# Patient Record
Sex: MALE | Race: AMERICAN INDIAN OR ALASKA NATIVE
[De-identification: names, ages, dates, MRNs, and addresses within clinical notes are randomized per-mention and may not be internally consistent; named-entity substitution may affect disease eponyms.]

---

## 2021-10-05 ENCOUNTER — HOSPITAL ENCOUNTER (OUTPATIENT)
Dept: HOSPITAL 5 - GIO | Age: 71
Discharge: HOME | End: 2021-10-05
Attending: INTERNAL MEDICINE
Payer: MEDICARE

## 2021-10-05 VITALS — DIASTOLIC BLOOD PRESSURE: 55 MMHG | SYSTOLIC BLOOD PRESSURE: 133 MMHG

## 2021-10-05 DIAGNOSIS — R13.10: Primary | ICD-10-CM

## 2021-10-05 DIAGNOSIS — E11.22: ICD-10-CM

## 2021-10-05 DIAGNOSIS — G47.30: ICD-10-CM

## 2021-10-05 DIAGNOSIS — K31.89: ICD-10-CM

## 2021-10-05 DIAGNOSIS — Z98.890: ICD-10-CM

## 2021-10-05 DIAGNOSIS — N18.30: ICD-10-CM

## 2021-10-05 DIAGNOSIS — Z87.891: ICD-10-CM

## 2021-10-05 DIAGNOSIS — K21.9: ICD-10-CM

## 2021-10-05 DIAGNOSIS — I25.10: ICD-10-CM

## 2021-10-05 DIAGNOSIS — K27.9: ICD-10-CM

## 2021-10-05 DIAGNOSIS — Z79.84: ICD-10-CM

## 2021-10-05 DIAGNOSIS — I12.9: ICD-10-CM

## 2021-10-05 DIAGNOSIS — Z79.899: ICD-10-CM

## 2021-10-05 DIAGNOSIS — E66.9: ICD-10-CM

## 2021-10-05 PROCEDURE — C1726 CATH, BAL DIL, NON-VASCULAR: HCPCS

## 2021-10-05 PROCEDURE — 43249 ESOPH EGD DILATION <30 MM: CPT

## 2021-10-05 PROCEDURE — 82962 GLUCOSE BLOOD TEST: CPT

## 2021-10-05 NOTE — ANESTHESIA DAY OF SURGERY
Anesthesia Day of Surgery





- Day of Surgery


Patient Examined: Yes


Patient H&P Reviewed: Yes


Patient is NPO: Yes


Beta Blockers: Yes (Patient took his bisoprolol today)

## 2021-10-05 NOTE — SHORT STAY SUMMARY
Short Stay Documentation


Date of service: 10/05/21


Narrative H&P: 





The patient presents for EGD with possible dilation for dysphagia.





- History


Past Medical History: CAD, diabetes, other (Renal insufficiency)


Past Surgical History: No surgical history


Social history: no significant social history





- Allergies and Medications


Current Medications: 


                                    Allergies





No Known Allergies Allergy (Unverified 09/30/21 13:19)


   





                                Home Medications











 Medication  Instructions  Recorded  Confirmed  Last Taken  Type


 


Amlodipine Besylate  09/30/21 09/30/21 Unknown History


 


Ascorbic Acid [Vitamin C]  09/30/21  Unknown History


 


Atorvastatin Calcium  09/30/21  Unknown History


 


Bisoprolol/Hydrochlorothiazide 1 each PO 09/30/21  Unknown History





[Bisoprolol-Hctz 5-6.25 mg Tab]     


 


Bumetanide [Bumex 1 mg tab] 1 mg PO 09/30/21  Unknown History


 


Cholecalciferol Vit D3 [Vitamin D3  09/30/21  Unknown History





1,000 UNIT TAB]     


 


Hydralazine HCl 100 mg PO 09/30/21  Unknown History


 


Latanoprost 0.005% [Xalatan 0.005%] 1 drop OP QPM 09/30/21 09/30/21 Unknown 

History


 


Losartan Potassium 100 mg PO 09/30/21  Unknown History


 


Metformin HCl [metFORMIN  mg PO 09/30/21  Unknown History





Osmotic]     


 


Nitroglycerin [Nitrostat] 0.4 mg SL 09/30/21  Unknown History


 


Omega-3S/Dha/Epa/Fish Oil [Fish 1 each PO 09/30/21  Unknown History





Oil Omega-3 Softgel]     


 


Omeprazole 20 mg PO 09/30/21  Unknown History


 


Pravastatin Sodium  09/30/21  Unknown History


 


Terazosin HCl 2 mg PO 09/30/21  Unknown History


 


Vitamin B12  09/30/21  Unknown History


 


Vitamin B6  09/30/21  Unknown History


 


Zinc [Zinc 50mg TAB] 50 mg PO 09/30/21  Unknown History


 


gemfibroziL [Lopid] 600 mg PO BID 09/30/21 09/30/21 Unknown History








Active Medications





Sodium Chloride (Nacl 0.9% 1000 Ml)  1,000 mls @ 50 mls/hr IV AS DIRECT MEJIA











- Physical exam


General appearance: no acute distress, well-nourished, obese


Integumentary: no rash, no growths, no abnormal pigmentation


HEENT: Atraumatic, PERRLA, EOMI, Mucous membr. moist/pink


Lungs: Clear to auscultation, Normal air movement


Breasts: deferred


Heart: Regular rate, Normal S1, Normal S2, No murmurs


Gastrointestinal: normoactive bowel sounds, no tenderness, no distended, no 

masses, no guarding, no organomegaly, obese


Male Genitourinary: deferred


Rectal Exam: deferred


Extremities: no ischemia, pulses intact, pulses symmetrical, No edema, normal 

temperature, normal color, Full ROM


Neurological: Normal gait, Normal speech, Strength at 5/5 X4 ext, Normal tone, 

Sensation intact, Cranial nerves 3-12 NL





- Brief post op/procedure progress note


Date of procedure: 10/05/21


Procedure: 





see dictation


Estimated blood loss: none


Pathology: none


Condition: stable





- Disposition


Condition at discharge: Good


Disposition: 01 HOME / SELF CARE / HOMELESS





- Discharge Diagnoses


(1) Dysphagia


Status: Acute   





Short Stay Discharge Plan


Activity: other (no driving for 24 hours)


Weight Bearing Status: Full Weight Bearing


Diet: regular


Follow up with: 


MARYBETH RUSSELL MD [Primary Care Provider] - 7 Days

## 2021-10-05 NOTE — ANESTHESIA CONSULTATION
Anesthesia Consult and Med Hx


Date of service: 10/05/21





- Airway


Anesthetic Teeth Evaluation: Good


ROM Head & Neck: Adequate


Mental/Hyoid Distance: Adequate


Mallampati Class: Class III


Intubation Access Assessment: Possibly Difficult





- Pulmonary Exam


CTA: Yes





- Cardiac Exam


Cardiac Exam: RRR





- Pre-Operative Health Status


ASA Pre-Surgery Classification: ASA3


Proposed Anesthetic Plan: MAC





- Pulmonary


Hx Smoking: Yes (quit ~30 yrs ago)


Hx Asthma: No


COPD: No


Hx Sleep Apnea: Yes (uses CPAP)





- Cardiovascular System


Hx Hypertension: Yes


Hx Coronary Artery Disease: Yes


Hx Heart Attack/AMI: No


Hx Angina: Yes (denies chest pain within the last month)





- Central Nervous System


Hx Back Pain: Yes (occasional )





- Gastrointestinal


Hx Gastroesophageal Reflux Disease: Yes (well controlled)





- Endocrine


Hx Renal Disease: Yes (Stage III CKD)


Hx Non-Insulin Dependent Diabetes: Yes (metformin only)


Hx Thyroid Disease: No





- Hematic


Hx Anemia: No





- Other Systems


Hx Alcohol Use: No


Hx Substance Use: No


Hx Cancer: No


Hx Obesity: Yes (BMI 41)





- Additional Comments


Anesthesia Medical History Comments: No hx of anesthetic complications.

## 2021-10-05 NOTE — OPERATIVE REPORT
Operative Report


Operative Report: 





Date of procedure: 10/5/2021


 


Procedure: Esophagogastroduodenoscopy with balloon dilation to 18 mm


 


Preprocedure diagnosis: Dysphagia to solid foods


 


Post procedure diagnosis: Mild peptic stricture.


 


Endoscopist: Dr. Tirado


 


Anesthesia: Monitored anesthesia care per anesthesia department


 


Medications: Propofol per anesthesia


 


Estimated blood loss: 0


 


After careful discussion of the nature and purpose of the procedure as well as 

details the technique risks benefits and alternatives consent was obtained.  The

patient was placed in the left lateral decubitus position and medicated per 

anesthesia.  The tip of the Leap  video scope was passed per orum under

direct vision into the esophagus and advanced into the stomach and descending 

duodenum.  The descending duodenum the duodenal bulb and pylorus were 

symmetrical and normal.  The scope was withdrawn into the stomach and the 

stomach then gently insufflated with air.  The antrum was normal.  The stomach 

was further insufflated and the scope was then retroflexed and partially 

withdrawn.  The cardia, fundus, and body of the stomach were within normal 

limits and easily distensible.The scope was then withdrawn in the forward 

position.  The esophagogastric junction was at 40 cm.  A mild peptic stricture 

was noted at the EG junction which provided no resistance to scope passage.  The

esophageal body was otherwise normal throughout.  Dilation was performed with a 

through-the-scope 15 to 18 mm balloon over 2 to 3 minutes.  The balloon was 

deflated and withdrawn.  The site was then reinspected and minimal trauma was 

evident.  The procedure was was well tolerated and the patient was observed in 

recovery.


 


Impressions: Mild peptic stricture, status post dilation from 15 to 18 mm.


 


Plan: Continue acid suppression therapy.  Office follow-up in 6 months.


 


 


Electronically signed:  Abhilash Tirado MD